# Patient Record
(demographics unavailable — no encounter records)

---

## 2024-12-02 NOTE — PHYSICAL EXAM
[Chaperone Present] : A chaperone was present in the examining room during all aspects of the physical examination [19239] : A chaperone was present during the pelvic exam. [FreeTextEntry2] : Alpa ORDONEZ [Labia Majora Erythema] : erythema of the labia majora [Labia Minora Erythema] : erythema of the labia minora [Normal] : uterus [No Bleeding] : there was no active vaginal bleeding [Uterine Adnexae] : were not tender and not enlarged

## 2024-12-02 NOTE — CHIEF COMPLAINT
[Urgent Visit] : Urgent Visit [FreeTextEntry1] : 32 y/o  female, LMP: 24, presents for urgent visit c/o vaginal itch w slight odor.  h/o recurrent BV.  Sexually active w male partners, unprotected.

## 2025-01-13 NOTE — HISTORY OF PRESENT ILLNESS
[FreeTextEntry1] : Chief Complaint: 32 y/o  female, LMP: 24, presents for f/u visit after being treated for trichomonas and BV w flagyl.  ROS:  Denies fever/chills, HA, Cough/sore throat, CP, SOB, N/V, Diarrhea/Constipation, Pelvic pain, dysuria, urinary urgency and burning, irregular vaginal bleeding, discharge or irritation. [Normal Amount/Duration] :  normal amount and duration [Patient refuses STI testing] : Patient refuses STI testing